# Patient Record
Sex: FEMALE | Race: WHITE
[De-identification: names, ages, dates, MRNs, and addresses within clinical notes are randomized per-mention and may not be internally consistent; named-entity substitution may affect disease eponyms.]

---

## 2018-10-23 NOTE — ERPHSYRPT
- History of Present Illness


Time Seen by Provider: 10/23/18 02:15


Source: patient


Exam Limitations: no limitations


Patient Subjective Stated Complaint: pt stated she woke up to use the bathroom 

and could feel her heart go into "afib" c/o palpitations, anxiousness, and 

slight SOB.


Triage Nursing Assessment: A/O X4 speech clear, resp easy. no c/o chest pain or 

disc. O2 sat 98%, monitor  afib


Physician History: 





This is a 58-year-old white female with history of bronchitis, pneumonia, 

arrhythmia (atrial fibrillation), hyperlipidemia, high blood pressure, 

diverticulosis, GERD, hernia, fractures, mitral valve prolapse





She states that she gets episodes of atrial fibrillation every year or 2.


But normally has a regular rhythm.


She states that she was in bed was getting ready to get up to go to the 

bathroom when she started feeling as if her heart was irregular.


She denies any chest pain any shortness of breath she has no nausea no vomiting.








Past medical history includes bronchitis, pneumonia, arrhythmia, hyperlipidemia

, high blood pressure, diverticulosis, GERD, hernia, fractures, mitral valve 

prolapse





Past surgical history includes cardioversion, several ablation, D&C, 

tonsillectomy and adenoidectomy








Social history patient denies tobacco alcohol or illicit drug use she denies 

caffeine intake





Patient is chronically on Elequis














Timing/Duration: today (just prior to arrival)


Severity: moderate


Modifying Factors: Improves With: nothing


Associated Symptoms: other (palpation and irregular heart rate), No nausea, No 

vomiting, No abdominal pain, No shortness of breath, No heartburn, No 

diaphoresis, No cough, No chills, No chest pain, No fever, No headaches, No 

loss of appetite, No malaise, No rash, No syncope, No seizure, No weakness


Allergies/Adverse Reactions: 








Penicillins Allergy (Verified 11/11/16 14:45)


 





Home Medications: 








Multivitamin [Multivitamins] 1 tab PO DAILY 03/09/15 [History]


PANTOPRAZOLE 40 mg Tablet*** [Protonix 40MG Tablet***] 40 mg PO DAILY 03/09/15 [

History]


Metoprolol Succinate 25 mg Xl* [Toprol-Xl 25MG Tablets***] 25 mg PO DAILY 11/11/ 16 [History]


Potassium Gluconate 100 mg PO DAILY 11/11/16 [History]





Hx Tetanus, Diphtheria Vaccination/Date Given: No


Hx Influenza Vaccination/Date Given: Yes


Hx Pneumococcal Vaccination/Date Given: No





- Review of Systems


Constitutional: No Fever, No Chills


Eyes: No Symptoms


Ears, Nose, & Throat: No Symptoms


Respiratory: No Cough, No Dyspnea


Cardiac: Palpitations, No Chest Pain, No Edema, No Syncope, No Orthopnea, No PND


Abdominal/Gastrointestinal: No Abdominal Pain, No Nausea, No Vomiting, No 

Diarrhea


Genitourinary Symptoms: No Dysuria


Musculoskeletal: No Back Pain, No Neck Pain


Skin: No Rash


Neurological: No Dizziness, No Focal Weakness, No Sensory Changes


Psychological: No Symptoms


Endocrine: No Symptoms


All Other Systems: Reviewed and Negative





- Past Medical History


Pertinent Past Medical History: Yes


Neurological History: No Pertinent History


ENT History: No Pertinent History


Cardiac History: Arrhythmia, High Cholesterol, Hypertension


Respiratory History: Bronchitis, Pneumonia


Endocrine Medical History: No Pertinent History


Musculoskeletal History: Fractures


GI Medical History: Diverticulitis, Diverticulosis, GERD, Hernia


 History: No Pertinent History


Psycho-Social History: No Pertinent History


Female Reproductive Disorders: Other


Other Medical History: MVP





- Past Surgical History


Past Surgical History: Yes


Neuro Surgical History: No Pertinent History


Cardiac: No Pertinent History


Respiratory: No Pertinent History


Gastrointestinal: Hernia Repair


Genitourinary: No Pertinent History


Musculoskeletal: Orthopedic Surgery


Female Surgical History: Tubal Ligation


Other Surgical History: CARDIOVERSION - THERMAL ABLATION - DandC; T&A





- Social History


Smoking Status: Never smoker


Exposure to second hand smoke: No


Drug Use: none


Patient Lives Alone: No





- Nursing Vital Signs


Nursing Vital Signs: 


 Initial Vital Signs











Temperature  97.6 F   10/23/18 01:53


 


Pulse Rate  86   10/23/18 01:53


 


Respiratory Rate  18   10/23/18 01:53


 


Blood Pressure  101/68   10/23/18 01:53


 


O2 Sat by Pulse Oximetry  98   10/23/18 01:53








 Pain Scale











Pain Intensity                 0

















- Physical Exam


General Appearance: no apparent distress, alert


Eye Exam: PERRL/EOMI, eyes nml inspection


Ears, Nose, Throat Exam: normal ENT inspection, TMs normal, pharynx normal, 

moist mucous membranes


Neck Exam: normal inspection, non-tender, supple, full range of motion


Respiratory Exam: normal breath sounds, lungs clear, No respiratory distress


Cardiovascular Exam: normal heart sounds, normal peripheral pulses, capillary 

refill <2 sec, No regular rate/rhythm (heart irregularly irregular), No murmur


Gastrointestinal/Abdomen Exam: soft, normal bowel sounds, No tenderness, No mass


Back Exam: normal inspection, normal range of motion, No CVA tenderness, No 

vertebral tenderness


Extremity Exam: normal inspection, normal range of motion, pelvis stable


Neurologic Exam: alert, oriented x 3, cooperative, CNs II-XII nml as tested, 

normal mood/affect, nml cerebellar function, nml station & gait, sensation nml, 

No motor deficits


Skin Exam: normal color, warm, dry, No rash


**SpO2 Interpretation**: normal (98%)


SpO2: 98


Oxygen Delivery: Room Air





- Course


Nursing assessment & vital signs reviewed: Yes


EKG Interpreted by Me: RATE (67 bpm), A-fib, NORMAL AXIS, Other (EKG: Atrial 

fibrillation, 67 bpm, normal axis, no acute ST or T wave changes noted)


Ordered Tests: 


 Active Orders 24 hr











 Category Date Time Status


 


 Cardiac Monitor STAT Care  10/23/18 02:20 Active


 


 EKG-ER Only STAT Care  10/23/18 02:20 Active


 


 EKG-ER Only STAT Care  10/23/18 03:18 Active


 


 IV Insertion STAT Care  10/23/18 02:20 Active


 


 Pulse Oximetry (ED) STAT Care  10/23/18 02:20 Active


 


 CBC W DIFF Stat Lab  10/23/18 02:15 Completed


 


 CMP Stat Lab  10/23/18 02:15 Completed


 


 TROPONIN Q3H Lab  10/23/18 02:15 Completed


 


 TROPONIN Q3H Lab  10/23/18 05:30 Ordered


 


 TROPONIN Q3H Lab  10/23/18 08:30 Ordered


 


 TROPONIN Q3H Lab  10/23/18 11:30 Ordered


 


 TROPONIN Q3H Lab  10/23/18 14:30 Ordered








Medication Summary














Discontinued Medications














Generic Name Dose Route Start Last Admin





  Trade Name Freq  PRN Reason Stop Dose Admin


 


Potassium Chloride  20 meq  10/23/18 03:28  10/23/18 03:32





  Klor Con 10 Meq***  PO  10/23/18 03:29  20 meq





  STAT ONE   Administration





     





     





     





     


 


Potassium Chloride  Confirm  10/23/18 03:31  





  Klor Con 10 Meq***  Administered  10/23/18 03:32  





  Dose   





  20 meq   





  PO   





  .[employer]-MED ONE   





     





     





     





     











Lab/Rad Data: 


 Laboratory Result Diagrams





 10/23/18 02:15 





 10/23/18 02:15 





 Laboratory Results











  10/23/18 10/23/18 10/23/18 Range/Units





  02:15 02:15 02:15 


 


WBC    5.8  (4.0-10.5)  K/mm3


 


RBC    4.38  (4.1-5.4)  M/mm3


 


Hgb    13.9  (12.0-16.0)  gm/dl


 


Hct    40.9  (35-47)  %


 


MCV    93.4  ()  fl


 


MCH    31.7  (26-32)  pg


 


MCHC    34.0  (32-36)  g/dl


 


RDW    12.7  (11.5-14.0)  %


 


Plt Count    250  (150-450)  K/mm3


 


MPV    10.5 H  (6-9.5)  fl


 


Gran %    45.1  (36.0-66.0)  %


 


Eos # (Auto)    0.01  (0-0.5)  


 


Absolute Lymphs (auto)    2.53  (1.0-4.6)  


 


Absolute Monos (auto)    0.66  (0.0-1.3)  


 


Lymphocytes %    43.4  (24.0-44.0)  %


 


Monocytes %    11.3  (0.0-12.0)  %


 


Eosinophils %    0.2  (0.00-5.0)  %


 


Basophils %    0.0  (0.0-0.4)  %


 


Absolute Granulocytes    2.63  (1.4-6.9)  


 


Basophils #    0  (0-0.4)  


 


Sodium   139   (137-145)  mmol/L


 


Potassium   3.4 L   (3.5-5.1)  mmol/L


 


Chloride   107   ()  mmol/L


 


Carbon Dioxide   25   (22-30)  mmol/L


 


Anion Gap   10.4   (5-15)  MEQ/L


 


BUN   14   (7-17)  mg/dL


 


Creatinine   0.77   (0.52-1.04)  mg/dL


 


Estimated GFR   > 60.0   ML/MIN


 


Glucose   105   ()  mg/dL


 


Calcium   9.3   (8.4-10.2)  mg/dL


 


Total Bilirubin   0.30   (0.2-1.3)  mg/dL


 


AST   22   (14-36)  U/L


 


ALT   29   (0-35)  U/L


 


Alkaline Phosphatase   89   ()  U/L


 


Troponin I  < 0.012    (0.000-0.034)  ng/mL


 


Serum Total Protein   6.6   (6.3-8.2)  g/dL


 


Albumin   4.0   (3.5-5.0)  g/dL














- Progress


Progress: improved


Progress Note: 





10/23/18 03:36


This is a 58-year-old white female with history of bronchitis, pneumonia, 

arrhythmia (atrial fibrillation intermittent), hyperlipidemia, high blood 

pressure, diverticulosis, GERD, hernia, mitral valve prolapse.


Who has had a cardioversion and thermal ablation in the past who states she is 

not usually in atrial fibrillation but does go into it every year or 2.





She states that she's been very busy she went to [place] she just came back 

she states she was in bed and she started to feel a markedly irregular heart 

rate which she recognized as atrial fibrillation and came into the emergency 

room.  On arrival the patient had atrial fibrillation 67 bpm patient had stable 

vital signs she had a pulse ox of 98% she was not any acute distress that she 

did not have chest pain.





Patient is already on Elequis.





 she is also on Cardizem and metoprolol








Patient was not given aspirin she did not have any chest pain


Labs including CBC CMP troponin were obtained on this patient.





Patient converted spontaneously to a normal sinus rhythm








Second EKG obtained at October 23, 2018 at 3:22 AM


Is remarkable for normal sinus rhythm 62 bpm normal axis no acute ST or T wave 

changes normal EKG.  Patient's chemistry is remarkable for a mild decreased 

potassium of 3.4 otherwise normal CBC is normal troponin is less than 0.012.





Patient has had no chest pain nausea or shortness of breath she is stable.


She has been given 20 mEq of potassium chloride.





I've discussed the patient's case with Dr. Sellers he feels that the patient can 

be sent home due to the fact that she is already on Cardizem metoprolol and 

Elequis and has a controlled rate and at this time normal sinus rhythm.


Patient is to return home she is to rest she is to contact Dr. De Souza for follow

-up.


She has been advised that if she has any problems she is to follow-up with her 

family doctor or return.


She is to return for acute distress or for severe symptoms.














- Departure


Time of Disposition: 03:41


Departure Disposition: Home


Clinical Impression: 


 Palpitations





Atrial fibrillation


Qualifiers:


 Atrial fibrillation type: unspecified Qualified Code(s): I48.91 - Unspecified 

atrial fibrillation





Condition: Fair


Critical Care Time: No


Referrals: 


Provider,Unknown [NON-STAFF PHY W/O PRIVILEGES] - 


Instructions:  Arrhythmias (DC), Palpitations (DC)


Additional Instructions: 


Return home, rest, plenty of fluids.


Avoid caffeine.


Medications as prescribed by your family doctor.


Contact your family doctor tomorrow and schedule follow-up appointment.


Return for acute distress or severe symptoms.


Follow-up with your family doctor or return for any problems.

## 2019-08-30 ENCOUNTER — HOSPITAL ENCOUNTER (EMERGENCY)
Dept: HOSPITAL 33 - ED | Age: 59
LOS: 1 days | Discharge: HOME | End: 2019-08-31
Payer: COMMERCIAL

## 2019-08-30 DIAGNOSIS — Z79.01: ICD-10-CM

## 2019-08-30 DIAGNOSIS — I48.91: Primary | ICD-10-CM

## 2019-08-30 LAB
ALBUMIN SERPL-MCNC: 4.4 G/DL (ref 3.5–5)
ALP SERPL-CCNC: 110 U/L (ref 38–126)
ALT SERPL-CCNC: 51 U/L (ref 0–35)
ANION GAP SERPL CALC-SCNC: 13.4 MEQ/L (ref 5–15)
AST SERPL QL: 38 U/L (ref 14–36)
BASOPHILS # BLD AUTO: 0 10*3/UL (ref 0–0.4)
BASOPHILS NFR BLD AUTO: 0 % (ref 0–0.4)
BILIRUB BLD-MCNC: 0.4 MG/DL (ref 0.2–1.3)
BNP SERPL-MCNC: 95.2 PG/ML (ref 0–900)
BUN SERPL-MCNC: 16 MG/DL (ref 7–17)
CALCIUM SPEC-MCNC: 9.6 MG/DL (ref 8.4–10.2)
CHLORIDE SERPL-SCNC: 106 MMOL/L (ref 98–107)
CO2 SERPL-SCNC: 25 MMOL/L (ref 22–30)
CREAT SERPL-MCNC: 0.72 MG/DL (ref 0.52–1.04)
EOSINOPHIL # BLD AUTO: 0.01 10*3/UL (ref 0–0.5)
GLUCOSE SERPL-MCNC: 140 MG/DL (ref 74–106)
GRANULOCYTES # BLD AUTO: 3.02 10*3/UL (ref 1.4–6.9)
HCT VFR BLD AUTO: 40.3 % (ref 35–47)
HGB BLD-MCNC: 13.8 GM/DL (ref 12–16)
LYMPHOCYTES # SPEC AUTO: 3.62 10*3/UL (ref 1–4.6)
MCH RBC QN AUTO: 31.6 PG (ref 26–32)
MCHC RBC AUTO-ENTMCNC: 34.2 G/DL (ref 32–36)
MONOCYTES # BLD AUTO: 0.8 10*3/UL (ref 0–1.3)
NEUTROPHILS NFR BLD AUTO: 40.6 % (ref 36–66)
PLATELET # BLD AUTO: 225 K/MM3 (ref 150–450)
POTASSIUM SERPLBLD-SCNC: 3.3 MMOL/L (ref 3.5–5.1)
PROT SERPL-MCNC: 7.7 G/DL (ref 6.3–8.2)
RBC # BLD AUTO: 4.37 M/MM3 (ref 4.1–5.4)
SODIUM SERPL-SCNC: 141 MMOL/L (ref 137–145)
WBC # BLD AUTO: 7.5 K/MM3 (ref 4–10.5)

## 2019-08-30 PROCEDURE — 80053 COMPREHEN METABOLIC PANEL: CPT

## 2019-08-30 PROCEDURE — 83880 ASSAY OF NATRIURETIC PEPTIDE: CPT

## 2019-08-30 PROCEDURE — 85025 COMPLETE CBC W/AUTO DIFF WBC: CPT

## 2019-08-30 PROCEDURE — 36415 COLL VENOUS BLD VENIPUNCTURE: CPT

## 2019-08-30 PROCEDURE — 85379 FIBRIN DEGRADATION QUANT: CPT

## 2019-08-30 PROCEDURE — 99283 EMERGENCY DEPT VISIT LOW MDM: CPT

## 2019-08-30 PROCEDURE — 84484 ASSAY OF TROPONIN QUANT: CPT

## 2019-08-31 VITALS — HEART RATE: 72 BPM | DIASTOLIC BLOOD PRESSURE: 74 MMHG | SYSTOLIC BLOOD PRESSURE: 97 MMHG

## 2019-08-31 VITALS — OXYGEN SATURATION: 96 %

## 2019-08-31 NOTE — ERPHSYRPT
- History of Present Illness


Source: patient


Exam Limitations: no limitations


Patient Subjective Stated Complaint: Patient states, "My heart is in A-fib".


Triage Nursing Assessment: Patient ambulated back to ED and transferred self to 

bed. Patient A+O X3. Patient's skin pink, warm and dry. Patient complains of 

her heart being in a-fib. Patient states she was sitting on the couch and felt 

her heart go into a-fib. Patient states she has a dx of a-fib for a long time. 

Patient complains of being tired. Patient denies pain or discomofort or SOB. 

Patient also denies chest pain. lungs clear a/p gonzales. Heart tones audible and 

irregular.


Physician History: 





Pt is a 58 y/o female that has a diagnosis of PAF.  She is on rate control with 

Metoprolol, Cardizem and Eliquis as anti coagulant.  Pt stated, that she felt 

her heart getting into A fib, and she presented to the ED.  Pt denies chest pain

, or SOB, she is feeling uncomfortable feeling the A fib, but denies N/V/D.  No 

F/C/S.  No dysuria, frequency and urgency.


Timing/Duration: today


Activities at Onset: none


Severity of Pain-Max: none


Severity of Pain-Current: none


Modifying Factors: Improves With: nothing


Nitro Today/Relief: no nitro taken today


Aspirin Treatment Today: no aspirin today


Associated Symptoms: denies symptoms


Allergies/Adverse Reactions: 








Penicillins Allergy (Verified 08/30/19 22:23)


 





Home Medications: 








PANTOPRAZOLE 40 mg Tablet*** [Protonix 40MG Tablet***] 40 mg PO DAILY 03/09/15 [

History]


Metoprolol Succinate 25 mg Xl* [Toprol-Xl 25MG Tablets***] 50 mg PO DAILY 11/11/ 16 [History]


Potassium Gluconate 100 mg PO DAILY 11/11/16 [History]





Hx Tetanus, Diphtheria Vaccination/Date Given: No


Hx Influenza Vaccination/Date Given: Yes


Hx Pneumococcal Vaccination/Date Given: No


Immunizations Up to Date: Yes





- Review of Systems


Constitutional: Fatigue


Eyes: No Symptoms


Ears, Nose, & Throat: No Symptoms


Respiratory: No Cough, No Dyspnea


Cardiac: Palpitations


Abdominal/Gastrointestinal: No Abdominal Pain, No Nausea, No Vomiting, No 

Diarrhea


Genitourinary Symptoms: No Dysuria


Musculoskeletal: No Back Pain, No Neck Pain


Neurological: No Dizziness, No Focal Weakness, No Sensory Changes





- Past Medical History


Pertinent Past Medical History: Yes


Neurological History: No Pertinent History


ENT History: No Pertinent History


Cardiac History: Arrhythmia, High Cholesterol, Hypertension


Respiratory History: Bronchitis, Pneumonia


Endocrine Medical History: No Pertinent History


Musculoskeletal History: Fractures


GI Medical History: Diverticulitis, Diverticulosis, GERD, Hernia


 History: No Pertinent History


Psycho-Social History: No Pertinent History


Female Reproductive Disorders: Other


Other Medical History: MVP





- Past Surgical History


Past Surgical History: Yes


Neuro Surgical History: No Pertinent History


Cardiac: No Pertinent History


Respiratory: No Pertinent History


Gastrointestinal: Hernia Repair


Genitourinary: No Pertinent History


Musculoskeletal: Orthopedic Surgery


Female Surgical History: Tubal Ligation


Other Surgical History: CARDIOVERSION - THERMAL ABLATION - DandC; T&A





- Social History


Smoking Status: Never smoker


Exposure to second hand smoke: No


Drug Use: none


Patient Lives Alone: Yes





- Female History


Hx Last Menstrual Period: Ablation


Hx Pregnant Now: No





- Nursing Vital Signs


Nursing Vital Signs: 


 Initial Vital Signs











Temperature  98.1 F   08/30/19 22:14


 


Pulse Rate  108 H  08/30/19 22:14


 


Respiratory Rate  21   08/30/19 22:14


 


Blood Pressure  150/100   08/30/19 22:14


 


O2 Sat by Pulse Oximetry  97   08/30/19 22:14








 Pain Scale











Pain Intensity                 0

















- Physical Exam


General Appearance: no apparent distress, alert


Eye Exam: PERRL/EOMI, eyes nml inspection


Ears, Nose, Throat Exam: normal ENT inspection, moist mucous membranes


Neck Exam: normal inspection, non-tender, supple


Respiratory Exam: normal breath sounds, lungs clear, No respiratory distress


Cardiovascular Exam: normal heart sounds, normal peripheral pulses, irregular, 

capillary refill <2 sec


Gastrointestinal/Abdomen Exam: soft, No tenderness, No mass


Back Exam: normal inspection, No CVA tenderness, No vertebral tenderness


Extremity Exam: normal inspection, normal range of motion


Neurologic Exam: alert, oriented x 3, cooperative, normal mood/affect, nml 

cerebellar function, sensation nml, No motor deficits


SpO2: 96





- Course


Nursing assessment & vital signs reviewed: Yes


Ordered Tests: 


 Active Orders 24 hr











 Category Date Time Status


 


 CBC W DIFF Stat Lab  08/30/19 22:31 Completed


 


 CMP Stat Lab  08/30/19 22:31 Completed


 


 D-DIMER QUANTITATION Stat Lab  08/30/19 22:31 Completed


 


 NT PRO BNP Stat Lab  08/30/19 22:31 Completed


 


 TROPONIN Q3H Lab  08/30/19 22:31 Completed


 


 TROPONIN Q3H Lab  08/31/19 01:37 Completed


 


 TROPONIN Q3H Lab  08/31/19 04:30 Ordered


 


 TROPONIN Q3H Lab  08/31/19 07:30 Ordered


 


 TROPONIN Q3H Lab  08/31/19 10:30 Ordered








Medication Summary














Discontinued Medications














Generic Name Dose Route Start Last Admin





  Trade Name Josepq  PRN Reason Stop Dose Admin


 


Potassium Chloride  40 meq  08/30/19 23:06  08/30/19 23:10





  Klor Con 10 Meq***  PO  08/30/19 23:07  40 meq





  STAT ONE   Administration





     





     





     





     


 


Potassium Chloride  Confirm  08/30/19 23:09  





  Klor Con 10 Meq***  Administered  08/30/19 23:10  





  Dose   





  40 meq   





  PO   





  .[employer]-MED ONE   





     





     





     





     











Lab/Rad Data: 


 Laboratory Result Diagrams





 08/30/19 22:31 





 08/30/19 22:31 





 Laboratory Results











  08/31/19 08/30/19 08/30/19 Range/Units





  01:37 22:31 22:31 


 


WBC     (4.0-10.5)  K/mm3


 


RBC     (4.1-5.4)  M/mm3


 


Hgb     (12.0-16.0)  gm/dl


 


Hct     (35-47)  %


 


MCV     ()  fl


 


MCH     (26-32)  pg


 


MCHC     (32-36)  g/dl


 


RDW     (11.5-14.0)  %


 


Plt Count     (150-450)  K/mm3


 


MPV     (6-9.5)  fl


 


Gran %     (36.0-66.0)  %


 


Eos # (Auto)     (0-0.5)  


 


Absolute Lymphs (auto)     (1.0-4.6)  


 


Absolute Monos (auto)     (0.0-1.3)  


 


Lymphocytes %     (24.0-44.0)  %


 


Monocytes %     (0.0-12.0)  %


 


Eosinophils %     (0.00-5.0)  %


 


Basophils %     (0.0-0.4)  %


 


Absolute Granulocytes     (1.4-6.9)  


 


Basophils #     (0-0.4)  


 


D-Dimer    366  (215-500)  ng/mL


 


Sodium     (137-145)  mmol/L


 


Potassium     (3.5-5.1)  mmol/L


 


Chloride     ()  mmol/L


 


Carbon Dioxide     (22-30)  mmol/L


 


Anion Gap     (5-15)  MEQ/L


 


BUN     (7-17)  mg/dL


 


Creatinine     (0.52-1.04)  mg/dL


 


Estimated GFR     ML/MIN


 


Glucose     ()  mg/dL


 


Calcium     (8.4-10.2)  mg/dL


 


Total Bilirubin     (0.2-1.3)  mg/dL


 


AST     (14-36)  U/L


 


ALT     (0-35)  U/L


 


Alkaline Phosphatase     ()  U/L


 


Troponin I  < 0.012  < 0.012   (0.000-0.034)  ng/mL


 


NT-Pro-B Natriuret Pep     (0-900)  pg/mL


 


Serum Total Protein     (6.3-8.2)  g/dL


 


Albumin     (3.5-5.0)  g/dL














  08/30/19 08/30/19 Range/Units





  22:31 22:31 


 


WBC   7.5  (4.0-10.5)  K/mm3


 


RBC   4.37  (4.1-5.4)  M/mm3


 


Hgb   13.8  (12.0-16.0)  gm/dl


 


Hct   40.3  (35-47)  %


 


MCV   92.2  ()  fl


 


MCH   31.6  (26-32)  pg


 


MCHC   34.2  (32-36)  g/dl


 


RDW   12.2  (11.5-14.0)  %


 


Plt Count   225  (150-450)  K/mm3


 


MPV   10.1 H  (6-9.5)  fl


 


Gran %   40.6  (36.0-66.0)  %


 


Eos # (Auto)   0.01  (0-0.5)  


 


Absolute Lymphs (auto)   3.62  (1.0-4.6)  


 


Absolute Monos (auto)   0.80  (0.0-1.3)  


 


Lymphocytes %   48.6 H  (24.0-44.0)  %


 


Monocytes %   10.7  (0.0-12.0)  %


 


Eosinophils %   0.1  (0.00-5.0)  %


 


Basophils %   0.0  (0.0-0.4)  %


 


Absolute Granulocytes   3.02  (1.4-6.9)  


 


Basophils #   0  (0-0.4)  


 


D-Dimer    (215-500)  ng/mL


 


Sodium  141   (137-145)  mmol/L


 


Potassium  3.3 L   (3.5-5.1)  mmol/L


 


Chloride  106   ()  mmol/L


 


Carbon Dioxide  25   (22-30)  mmol/L


 


Anion Gap  13.4   (5-15)  MEQ/L


 


BUN  16   (7-17)  mg/dL


 


Creatinine  0.72   (0.52-1.04)  mg/dL


 


Estimated GFR  > 60.0   ML/MIN


 


Glucose  140 H   ()  mg/dL


 


Calcium  9.6   (8.4-10.2)  mg/dL


 


Total Bilirubin  0.40   (0.2-1.3)  mg/dL


 


AST  38 H   (14-36)  U/L


 


ALT  51 H   (0-35)  U/L


 


Alkaline Phosphatase  110   ()  U/L


 


Troponin I    (0.000-0.034)  ng/mL


 


NT-Pro-B Natriuret Pep  95.2   (0-900)  pg/mL


 


Serum Total Protein  7.7   (6.3-8.2)  g/dL


 


Albumin  4.4   (3.5-5.0)  g/dL














- Progress


Air Movement: good


Progress Note: 





08/31/19 00:32


Pt was seen and examined.  She is in a controlled A fib.  She is on rate 

control meds, and on Eliquis.  Labs and first troponins are negative.  D dimer 

is normal as well.  Will f/u with second troponin, if negative, will d/c pt to 

home.


08/31/19 03:16


Pt's second troponin is negative and pt is cleared for d/c.  She should 

continue her meds, and f/u with her Cardiologist.


Blood Culture(s) Obtained: No


Antibiotics given: No


Will see patient in: office


Counseled pt/family regarding: need for follow-up





- Departure


Departure Disposition: Home


Clinical Impression: 


 A-fib





Condition: Stable


Critical Care Time: No


Referrals: 


SKINNY REZA [Primary Care Provider] - 


Additional Instructions: 


F/u with Cardiology.  Take your meds as ordered.